# Patient Record
Sex: FEMALE | Race: BLACK OR AFRICAN AMERICAN | NOT HISPANIC OR LATINO | Employment: FULL TIME
[De-identification: names, ages, dates, MRNs, and addresses within clinical notes are randomized per-mention and may not be internally consistent; named-entity substitution may affect disease eponyms.]

---

## 2017-09-17 ENCOUNTER — HEALTH MAINTENANCE LETTER (OUTPATIENT)
Age: 42
End: 2017-09-17

## 2019-05-28 ENCOUNTER — AMBULATORY - HEALTHEAST (OUTPATIENT)
Dept: MULTI SPECIALTY CLINIC | Facility: CLINIC | Age: 44
End: 2019-05-28

## 2019-05-28 LAB
HPV_EXT - HISTORICAL: NORMAL
PAP SMEAR - HIM PATIENT REPORTED: NORMAL

## 2019-10-17 ENCOUNTER — COMMUNICATION - HEALTHEAST (OUTPATIENT)
Dept: TELEHEALTH | Facility: CLINIC | Age: 44
End: 2019-10-17

## 2019-10-17 ENCOUNTER — OFFICE VISIT - HEALTHEAST (OUTPATIENT)
Dept: FAMILY MEDICINE | Facility: CLINIC | Age: 44
End: 2019-10-17

## 2019-10-17 DIAGNOSIS — Z00.00 ENCOUNTER FOR GENERAL ADULT MEDICAL EXAMINATION WITHOUT ABNORMAL FINDINGS: ICD-10-CM

## 2019-10-17 DIAGNOSIS — Z76.89 ENCOUNTER TO ESTABLISH CARE: ICD-10-CM

## 2019-10-17 DIAGNOSIS — Z12.31 VISIT FOR SCREENING MAMMOGRAM: ICD-10-CM

## 2019-10-17 DIAGNOSIS — N91.2 AMENORRHEA: ICD-10-CM

## 2019-10-17 ASSESSMENT — MIFFLIN-ST. JEOR: SCORE: 1449.63

## 2019-11-08 ENCOUNTER — HEALTH MAINTENANCE LETTER (OUTPATIENT)
Age: 44
End: 2019-11-08

## 2020-02-23 ENCOUNTER — HEALTH MAINTENANCE LETTER (OUTPATIENT)
Age: 45
End: 2020-02-23

## 2020-12-06 ENCOUNTER — HEALTH MAINTENANCE LETTER (OUTPATIENT)
Age: 45
End: 2020-12-06

## 2021-02-20 ENCOUNTER — HEALTH MAINTENANCE LETTER (OUTPATIENT)
Age: 46
End: 2021-02-20

## 2021-06-02 NOTE — PROGRESS NOTES
Advice to securely store firearms given in AVS.  The patient was counseled and encouraged to consider modifying their diet and eating habits. She was provided with information on recommended healthy diet options.

## 2021-06-03 VITALS
HEART RATE: 105 BPM | HEIGHT: 65 IN | BODY MASS INDEX: 29.89 KG/M2 | DIASTOLIC BLOOD PRESSURE: 65 MMHG | SYSTOLIC BLOOD PRESSURE: 103 MMHG | OXYGEN SATURATION: 96 % | WEIGHT: 179.4 LBS

## 2021-06-17 NOTE — PATIENT INSTRUCTIONS - HE
Patient Instructions by Elly Gilmore FNP at 10/17/2019 11:10 AM     Author: Elly Gilmore FNP Service: -- Author Type: Nurse Practitioner    Filed: 10/17/2019 11:49 AM Encounter Date: 10/17/2019 Status: Signed    : Elly Gilmore FNP (Nurse Practitioner)           Preventing Skin Cancer     Use sunscreen of SPF 30 or greater. Apply liberally.   Relaxing in the sun may feel good. But it isnt good for your skin. In fact, the suns harmful rays are the major cause of skin cancer. This is a serious disease that can be life-threatening. People of all ages, races, and backgrounds are at risk.  Skin cancer is the most common cancer in the [place] But in most cases, it can be prevented.  Your role in prevention  You can act today to help prevent skin cancer. Start by avoiding the suns UV (ultraviolet) rays. And dont use tanning beds or lamps. They are no safer than the sun. Taking these steps can help keep you from getting skin cancer. It can also help prevent wrinkles and other aging effects caused by the sun. Make sure your children also follow these safeguards. Now is the time to start taking steps to prevent skin cancer.  When you are outdoors  Protect your skin when you go out during the day. Take safety steps whenever you go out to eat, run errands by car or on foot, or do any outdoor activity. There isnt just one easy way to protect your skin. Its best to follow all of these steps:    Wear tightly woven clothing that covers your skin. Put on a wide-brimmed hat to protect your face, ears, and scalp.    Watch the clock. Try to stay out of the sun between 10 a.m. and 4 p.m. That's when the sun's rays are strongest.    Head for the shade or create your own. Use an umbrella when sitting or strolling.    Know that the suns rays can reflect off sand, water, and snow. This can harm your skin. Take extra care when you are near reflective surfaces.    Keep in mind that even when the weather is hazy or  "cloudy, your skin can be exposed to strong UV rays.    Shield your skin with sunscreen. Also use sunscreen on your childrens skin. Keep babies younger than 6 months old out of the sun.  Tips for using sunscreen  To help prevent skin cancer, choose the right sunscreen and use it correctly. Try these tips:    Choose a sunscreen that has an SPF (sun protection factor) of at least 30. Also choose a sunscreen labeled \"broad spectrum. This will protect you from both UVA and UVB (ultraviolet A and B) rays.    If one brand irritates your skin, try another, such as one without fragrance.    Use a water-resistant sunscreen if you swim or sweat.    Use at least 1 ounce of sunscreen to cover exposed areas. This is enough to fill a shot glass. You might need to adjust the amount depending on your body size.    Put the sunscreen on dry skin about 15 minutes before going outdoors. This gives it time to soak in.    Reapply sunscreen every 2 hours. If youre active, do this more often.    Cover any sun-exposed skin, from your face to your feet. Dont forget your scalp, ears, and lips.    Know that while sunscreen helps protect you, it isnt enough. Sunscreens extend the length of time you can be outdoors before your skin starts to get red. But they don't give you total protection. Using sunscreen doesn't mean you can stay out in the sun for an unlimited time. Your skin cells are still being damaged. You should also wear protective clothing. And try to stay out of the sun as much as you can, especially from 10 a.m. to 4 p.m.  Date Last Reviewed: 7/1/2019 2000-2019 [employer]. [address] [place] [address], [place] [address]. All rights reserved. This information is not intended as a substitute for professional medical care. Always follow your healthcare professional's instructions.          Firearm Safety  From 2005 to 2014, roughly 20,000 American minors were killed or seriously injured in accidental shootings; the majority " of those killed in these tragic accidents were aged 12 or younger. As such the American Academy of Pediatrics, American Academy of Family Physicians, American College of Physicians and the National Rifle Association all recommend preventing unintentional use of firearms by securely storing firearms in locked safe or at minimum a trigger lock.  Patient Education   Understanding USDA MyPlate  The USDA (US Department of Agriculture) has guidelines to help you make healthy food choices. These are called MyPlate. MyPlate shows the food groups that make up healthy meals using the image of a place setting. Before you eat, think about the healthiest choices for what to put onto your plate or into your cup or bowl. To learn more about building a healthy plate, visit www.choosemyplate.gov.       The Food Groups    Fruits: Any fruit or 100% fruit juice counts as part of the Fruit Group. Fruits may be fresh, canned, frozen, or dried, and may be whole, cut-up, or pureed. Make half your plate fruits and vegetables.    Vegetables: Any vegetable or 100% vegetable juice counts as a member of the Vegetable Group. Vegetables may be fresh, frozen, canned, or dried. They can be served raw or cooked and may be whole, cut-up, or mashed. Make half your plate fruits and vegetables.     Grains: All foods made from grains are part of the Grains Group. These include wheat, rice, oats, cornmeal, and barley such as bread, pasta, oatmeal, cereal, tortillas, and grits. Grains should be no more than a quarter of your plate. At least half of your grains should be whole grains.    Protein: This group includes meat, poultry, seafood, beans and peas, eggs, processed soy products (like tofu), nuts (including nut butters), and seeds. Make protein choices no more than a quarter of your plate. Meat and poultry choices should be lean or low fat.    Dairy: All fluid milk products and foods made from milk that contain calcium, like yogurt and cheese are part  of the Dairy Group. (Foods that have little calcium, such as cream, butter, and cream cheese, are not part of the group.) Most dairy choices should be low-fat or fat-free.    Oils: These are fats that are liquid at room temperature. They include canola, corn, olive, soybean, and sunflower oil. Foods that are mainly oil include mayonnaise, certain salad dressings, and soft margarines. You should have only 5 to 7 teaspoons of oils a day. You probably already get this much from the food you eat.  Use [employer] to Help Build Your Meals  The [employer] can help you plan and track your meals and activity. You can look up individual foods to see or compare their nutritional value. You can get guidelines for what and how much you should eat. You can compare your food choices. And you can assess personal physical activities and see ways you can improve. Go to www.[employer].gov/Seriouslycker/.    3978-6109 [employer]. [address] [place] [address], [place] [address]. All rights reserved. This information is not intended as a substitute for professional medical care. Always follow your healthcare professional's instructions.

## 2021-06-22 ENCOUNTER — COMMUNICATION - HEALTHEAST (OUTPATIENT)
Dept: SCHEDULING | Facility: CLINIC | Age: 46
End: 2021-06-22

## 2021-06-23 ENCOUNTER — COMMUNICATION - HEALTHEAST (OUTPATIENT)
Dept: SURGERY | Facility: CLINIC | Age: 46
End: 2021-06-23

## 2021-06-28 ENCOUNTER — COMMUNICATION - HEALTHEAST (OUTPATIENT)
Dept: SURGERY | Facility: CLINIC | Age: 46
End: 2021-06-28

## 2021-06-28 NOTE — PROGRESS NOTES
Progress Notes by Elly Gilmore FNP at 10/17/2019 11:10 AM     Author: Elly Gilmore FNP Service: -- Author Type: Nurse Practitioner    Filed: 10/17/2019 12:37 PM Encounter Date: 10/17/2019 Status: Signed    : Elly Gilmore FNP (Nurse Practitioner)       FEMALE PREVENTATIVE EXAM    Assessment and Plan:   1. Encounter to establish care  2. Encounter for general adult medical examination without abnormal findings  Healthy female patient   - HIV Antigen/Antibody Screening [place]; Future  - Comprehensive Metabolic Panel; Future  - Lipid Cascade; Future  - HM2(CBC w/o Differential); Future    3. Visit for screening mammogram  Order placed after discussing it relevance  - Mammo Screening Bilateral; Future    4. 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 Recommend that patient continue to monitor for another 8 months to confirm diagnosis.      Next follow up:  No follow-ups on file.    Immunization Review  Adult Imm Review: Declines immunizations today    I discussed the following with the patient:   Adult Healthy Living: Importance of regular exercise  Healthy nutrition  Getting adequate sleep  Use of seat belts  Distracted driving  Helmets  Sporting equipment safety  Firearm safety  STI prevention  Supplement use  Herbal medications/alternative medical therapies    I have had an Advance Directives discussion with the patient.    Subjective:   Chief Complaint: Oliva Berry is an 44 y.o. female here for a preventative health visit.     HPI:  Patient here to establish care and for preventive care. She reports that she is taking medication for headache which was prescribed by her neurologist. 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 Patient reports that she has not seen her period for 3 months. She reports this has happened before. She also reports that she think she has bacteria infection even though she does not have any symptoms. Patient denied chest pain, shortness of  "breath, fever and chills.     Healthy Habits  Are you taking a daily aspirin? No  Do you typically exercising at least 40 min, 3-4 times per week?  Yes  Do you usually eat at least 4 servings of fruit and vegetables a day, include whole grains and fiber and avoid regularly eating high fat foods? NO  Have you had an eye exam in the past two years? NO  Do you see a dentist twice per year? NO  Do you have any concerns regarding sleep? No    Safety Screen  If you own firearms, are they secured in a locked gun cabinet or with trigger locks? NO  No data recorded    Review of Systems:  Please see above.  The rest of the review of systems are negative for all systems.     Pap History:   Yes - updated in Problem List and Health Maintenance accordingly  Cancer Screening       Status Date      MAMMOGRAM Overdue 1974     PAP SMEAR Overdue 5/16/1999           Patient Care Team:  Elly Gilmore FNP as PCP - General (Nurse Practitioner)        History     Not marked as reviewed during this visit.            Objective:   Vital Signs: There were no vitals taken for this visit.       PHYSICAL EXAM  /65   Pulse (!) 105   Ht 5' 5\" (1.651 m)   Wt 179 lb 6.4 oz (81.4 kg)   LMP 07/15/2019 (Approximate)   SpO2 96%   BMI 29.85 kg/m    General appearance: alert, appears stated age and cooperative  Head: Normocephalic, without obvious abnormality, atraumatic  Eyes: conjunctivae/corneas clear. PERRL, EOM's intact. Fundi benign.  Ears: normal TM's and external ear canals both ears  Throat: lips, mucosa, and tongue normal; teeth and gums normal  Neck: no adenopathy, no carotid bruit, no JVD, supple, symmetrical, trachea midline and thyroid not enlarged, symmetric, no tenderness/mass/nodules  Back: symmetric, no curvature. ROM normal. No CVA tenderness.  Lungs: clear to auscultation bilaterally  Breasts: deferred  Heart: regular rate and rhythm, S1, S2 normal, no murmur, click, rub or gallop  Abdomen: soft, non-tender; bowel " sounds normal; no masses,  no organomegaly  Pelvic: deferred, patient had her little child with her.  Extremities: extremities normal, atraumatic, no cyanosis or edema  Pulses: 2+ and symmetric  Skin: Skin color, texture, turgor normal. No rashes or lesions  Lymph nodes: Cervical, supraclavicular, and axillary nodes normal.  Neurologic: Grossly normal      The ASCVD Risk score (Raymond TESS Luo., et al., 2013) failed to calculate for the following reasons:    Cannot find a previous HDL lab    Cannot find a previous total cholesterol lab         Medication List          Accurate as of October 17, 2019 12:33 PM. If you have any questions, ask your nurse or doctor.            CONTINUE taking these medications    topiramate 50 MG tablet  Also known as:  TOPAMAX  INSTRUCTIONS:  Take 50 mg by mouth.               Additional Screenings Completed Today:

## 2021-07-05 PROBLEM — E66.811 CLASS 1 OBESITY DUE TO EXCESS CALORIES WITHOUT SERIOUS COMORBIDITY WITH BODY MASS INDEX (BMI) OF 32.0 TO 32.9 IN ADULT: Status: ACTIVE | Noted: 2019-04-17

## 2021-07-05 PROBLEM — E66.09 CLASS 1 OBESITY DUE TO EXCESS CALORIES WITHOUT SERIOUS COMORBIDITY WITH BODY MASS INDEX (BMI) OF 32.0 TO 32.9 IN ADULT: Status: ACTIVE | Noted: 2019-04-17

## 2021-07-05 PROBLEM — T81.40XA POSTOPERATIVE INFECTION, INITIAL ENCOUNTER: Status: ACTIVE | Noted: 2021-06-21

## 2021-07-05 PROBLEM — G43.909 MIGRAINE: Status: ACTIVE | Noted: 2021-06-20

## 2021-07-05 PROBLEM — T81.40XA POSTOPERATIVE INFECTION, UNSPECIFIED TYPE, INITIAL ENCOUNTER: Status: ACTIVE | Noted: 2021-06-20

## 2021-07-06 ENCOUNTER — OFFICE VISIT - HEALTHEAST (OUTPATIENT)
Dept: SURGERY | Facility: CLINIC | Age: 46
End: 2021-07-06

## 2021-07-07 NOTE — LETTER
Letter by Scarlet Bhatt LPN at      Author: Scarlet Bhatt LPN Service: -- Author Type: --    Filed:  Encounter Date: 6/28/2021 Status: (Other)         June 28, 2021     Patient: Oliva Berry   YOB: 1975   Date of Visit: 6/28/2021       To Whom It May Concern:    It is my medical opinion that Oliva Berry may return to full duty immediately with no restrictions on 6/29/2021.    If you have any questions or concerns, please don't hesitate to call.    Sincerely,        Electronically signed by Prasanna PÉREZ

## 2021-07-13 ENCOUNTER — OFFICE VISIT (OUTPATIENT)
Dept: SURGERY | Facility: CLINIC | Age: 46
End: 2021-07-13
Payer: COMMERCIAL

## 2021-07-13 DIAGNOSIS — S31.109D OPEN WOUND OF ANTERIOR ABDOMINAL WALL, SUBSEQUENT ENCOUNTER: Primary | ICD-10-CM

## 2021-07-13 PROCEDURE — 99213 OFFICE O/P EST LOW 20 MIN: CPT | Performed by: PHYSICIAN ASSISTANT

## 2021-07-13 NOTE — LETTER
7/13/2021         RE: Oliva Berry  [address]  [place] [address]        Dear Colleague,    Thank you for referring your patient, Oliva Berry, to the [facility] [place]. Please see a copy of my visit note below.    HPI: Pt is here for follow up of a follow-up of wound that we saw patient in the hospital for wound care and subsequent postop care in the office.: 46-year-old female with history of panniculectomy that was performed in [place] on 6/15/2021 . She is doing well. She has been packing the wound. No issues.      There were no vitals taken for this visit.    EXAM:  GENERAL:Appears well  ABDOMEN:  Soft, +BS  Wounds are healing nicely and pretty much dry on the lateral edges. On the mid open wound she has a 0.4 x 0.4 cm open wound that is showing some granulation tissue. This is closing pretty close    Assessment/Plan: . Doing well after surgery and should follow up as needed. No further packing really needed at this point this wound is healing nicely. Cover with gauze until this heals completely.   Prasanna Greene PA-C PA-C            Again, thank you for allowing me to participate in the care of your patient.        Sincerely,        Prasanna Greene PA-C

## 2021-07-20 NOTE — PROGRESS NOTES
HPI: Pt is here for follow up of a follow-up of wound that we saw patient in the hospital for wound care and subsequent postop care in the office.: 46-year-old female with history of panniculectomy that was performed in [place] on 6/15/2021 . She is doing well. She has been packing the wound. No issues.      There were no vitals taken for this visit.    EXAM:  GENERAL:Appears well  ABDOMEN:  Soft, +BS  Wounds are healing nicely and pretty much dry on the lateral edges. On the mid open wound she has a 0.4 x 0.4 cm open wound that is showing some granulation tissue. This is closing pretty close    Assessment/Plan: . Doing well after surgery and should follow up as needed. No further packing really needed at this point this wound is healing nicely. Cover with gauze until this heals completely.   Prasanna Greene PA-C PA-C

## 2021-07-20 NOTE — PROGRESS NOTES
Progress Notes by Prasanna Greene PA-C at 7/6/2021  3:45 PM     Author: Prasanna Greene PA-C Service: -- Author Type: Physician Assistant    Filed: 7/20/2021  1:48 PM Encounter Date: 7/6/2021 Status: Signed    : Prasanna Greene PA-C (Physician Assistant)       HPI: Pt is here for follow up of a abdominal wound infection. 46-year-old female with history of panniculectomy that was performed in [place] on 6/15/2021. Seen by our group during hospital stay 6/20-6/22. Packing own wound. No fevers. No pain      There were no vitals taken for this visit.    EXAM:  GENERAL:Appears well  ABDOMEN:  Soft, +BS  SURGICAL WOUNDS:  Healing. Open wound midline with purulent drainage as not granulating and not being packed all the way.   Assessment/Plan: . Packing with silver cell for three days then back to wet to moist. See me in one to two weeks.   Prasanna Greene PA-C  [facility] Department of Surgery

## 2021-07-26 ENCOUNTER — OFFICE VISIT (OUTPATIENT)
Dept: SURGERY | Facility: CLINIC | Age: 46
End: 2021-07-26
Payer: COMMERCIAL

## 2021-07-26 DIAGNOSIS — S31.109D OPEN WOUND OF ANTERIOR ABDOMINAL WALL, SUBSEQUENT ENCOUNTER: ICD-10-CM

## 2021-07-26 DIAGNOSIS — R10.33 ABDOMINAL WALL PAIN IN PERIUMBILICAL REGION: Primary | ICD-10-CM

## 2021-07-26 PROCEDURE — 99213 OFFICE O/P EST LOW 20 MIN: CPT | Performed by: PHYSICIAN ASSISTANT

## 2021-07-26 NOTE — LETTER
7/26/2021         RE: Oliva Berry  [address]  [place] [address]        Dear Colleague,    Thank you for referring your patient, Oliva Berry, to the [facility] [place]. Please see a copy of my visit note below.    HPI:Pt is here for follow up of a abdominal wound infection. 46-year-old female with history of panniculectomy that was performed in [place] on 6/15/2021. Seen by our group during hospital stay 6/20-6/22.  At last visit last week patient was doing much better.  She decided to come in today as she was concerned about increase in drainage from her umbilicus.  Drainage is foul.  She also noticed a break down of her skin on her lower abdomen was concerned about a blister.  She also feels excessive tenderness in her lower abdomen.  Denies any fever or chills.      There were no vitals taken for this visit.    EXAM:  GENERAL:Appears well  ABDOMEN:  Soft, +BS  SURGICAL WOUNDS:  Incisions healing well, no enduration or drainage.  Wounds actually look really well.  Her bellybutton is quite deep which I think is what is causing some of the drainage.  She does have some tenderness abdominal wall but I cannot palpate any fluctuance or redness.  Patient quite concerned.  We will get an ultrasound        Assessment/Plan: . Doing well after surgery and should follow up as needed.  Discussed keeping her wounds clean and dry and she manus need to use a Q-tip to keep dry due to patient's concern and pain I ordered an ultrasound to look for fluid collection..  Recommend follow-up with plastics.  No further follow-up with us needed.  Prasanna Greene PA-C PA-C  [facility] Department of Surgery          Again, thank you for allowing me to participate in the care of your patient.        Sincerely,        Prasanna Greene PA-C

## 2021-07-27 NOTE — PROGRESS NOTES
HPI:Pt is here for follow up of a abdominal wound infection. 46-year-old female with history of panniculectomy that was performed in [place] on 6/15/2021. Seen by our group during hospital stay 6/20-6/22.  At last visit last week patient was doing much better.  She decided to come in today as she was concerned about increase in drainage from her umbilicus.  Drainage is foul.  She also noticed a break down of her skin on her lower abdomen was concerned about a blister.  She also feels excessive tenderness in her lower abdomen.  Denies any fever or chills.      There were no vitals taken for this visit.    EXAM:  GENERAL:Appears well  ABDOMEN:  Soft, +BS  SURGICAL WOUNDS:  Incisions healing well, no enduration or drainage.  Wounds actually look really well.  Her bellybutton is quite deep which I think is what is causing some of the drainage.  She does have some tenderness abdominal wall but I cannot palpate any fluctuance or redness.  Patient quite concerned.  We will get an ultrasound        Assessment/Plan: . Doing well after surgery and should follow up as needed.  Discussed keeping her wounds clean and dry and she manus need to use a Q-tip to keep dry due to patient's concern and pain I ordered an ultrasound to look for fluid collection..  Recommend follow-up with plastics.  No further follow-up with us needed.  Prasanna Greene PA-C PA-C  [facility] Department of Surgery

## 2021-09-25 ENCOUNTER — HEALTH MAINTENANCE LETTER (OUTPATIENT)
Age: 46
End: 2021-09-25

## 2022-01-15 ENCOUNTER — HEALTH MAINTENANCE LETTER (OUTPATIENT)
Age: 47
End: 2022-01-15

## 2022-03-12 ENCOUNTER — HEALTH MAINTENANCE LETTER (OUTPATIENT)
Age: 47
End: 2022-03-12

## 2023-04-22 ENCOUNTER — HEALTH MAINTENANCE LETTER (OUTPATIENT)
Age: 48
End: 2023-04-22

## 2023-12-02 ENCOUNTER — HEALTH MAINTENANCE LETTER (OUTPATIENT)
Age: 48
End: 2023-12-02

## 2024-01-07 ENCOUNTER — HOSPITAL ENCOUNTER (EMERGENCY)
Facility: CLINIC | Age: 49
Discharge: HOME | End: 2024-01-07
Attending: EMERGENCY MEDICINE | Admitting: EMERGENCY MEDICINE
Payer: COMMERCIAL

## 2024-01-07 ENCOUNTER — APPOINTMENT (OUTPATIENT)
Dept: CT IMAGING | Facility: CLINIC | Age: 49
End: 2024-01-07
Attending: EMERGENCY MEDICINE
Payer: COMMERCIAL

## 2024-01-07 VITALS
HEIGHT: 64 IN | BODY MASS INDEX: 31.76 KG/M2 | TEMPERATURE: 98.3 F | DIASTOLIC BLOOD PRESSURE: 74 MMHG | RESPIRATION RATE: 20 BRPM | SYSTOLIC BLOOD PRESSURE: 128 MMHG | WEIGHT: 186 LBS | HEART RATE: 109 BPM | OXYGEN SATURATION: 98 %

## 2024-01-07 DIAGNOSIS — M54.41 ACUTE RIGHT-SIDED LOW BACK PAIN WITH RIGHT-SIDED SCIATICA: ICD-10-CM

## 2024-01-07 LAB
ALBUMIN SERPL BCG-MCNC: 4.4 G/DL (ref 3.5–5.2)
ALBUMIN UR-MCNC: NEGATIVE MG/DL
ALP SERPL-CCNC: 108 U/L (ref 40–150)
ALT SERPL W P-5'-P-CCNC: 7 U/L (ref 0–50)
ANION GAP SERPL CALCULATED.3IONS-SCNC: 11 MMOL/L (ref 7–15)
APPEARANCE UR: CLEAR
AST SERPL W P-5'-P-CCNC: 26 U/L (ref 0–45)
BASOPHILS # BLD AUTO: 0 10E3/UL (ref 0–0.2)
BASOPHILS NFR BLD AUTO: 1 %
BILIRUB SERPL-MCNC: 0.6 MG/DL
BILIRUB UR QL STRIP: NEGATIVE
BUN SERPL-MCNC: 13 MG/DL (ref 6–20)
CALCIUM SERPL-MCNC: 9.3 MG/DL (ref 8.6–10)
CHLORIDE SERPL-SCNC: 109 MMOL/L (ref 98–107)
COLOR UR AUTO: ABNORMAL
CREAT SERPL-MCNC: 0.72 MG/DL (ref 0.51–0.95)
DEPRECATED HCO3 PLAS-SCNC: 18 MMOL/L (ref 22–29)
EGFRCR SERPLBLD CKD-EPI 2021: >90 ML/MIN/1.73M2
EOSINOPHIL # BLD AUTO: 0.1 10E3/UL (ref 0–0.7)
EOSINOPHIL NFR BLD AUTO: 2 %
ERYTHROCYTE [DISTWIDTH] IN BLOOD BY AUTOMATED COUNT: 13.2 % (ref 10–15)
GLUCOSE SERPL-MCNC: 92 MG/DL (ref 70–99)
GLUCOSE UR STRIP-MCNC: NEGATIVE MG/DL
HCT VFR BLD AUTO: 44.4 % (ref 35–47)
HGB BLD-MCNC: 14.8 G/DL (ref 11.7–15.7)
HGB UR QL STRIP: NEGATIVE
HOLD SPECIMEN: NORMAL
IMM GRANULOCYTES # BLD: 0 10E3/UL
IMM GRANULOCYTES NFR BLD: 0 %
KETONES UR STRIP-MCNC: NEGATIVE MG/DL
LEUKOCYTE ESTERASE UR QL STRIP: NEGATIVE
LIPASE SERPL-CCNC: 40 U/L (ref 13–60)
LYMPHOCYTES # BLD AUTO: 2.5 10E3/UL (ref 0.8–5.3)
LYMPHOCYTES NFR BLD AUTO: 37 %
MCH RBC QN AUTO: 29.4 PG (ref 26.5–33)
MCHC RBC AUTO-ENTMCNC: 33.3 G/DL (ref 31.5–36.5)
MCV RBC AUTO: 88 FL (ref 78–100)
MONOCYTES # BLD AUTO: 0.5 10E3/UL (ref 0–1.3)
MONOCYTES NFR BLD AUTO: 7 %
MUCOUS THREADS #/AREA URNS LPF: PRESENT /LPF
NEUTROPHILS # BLD AUTO: 3.7 10E3/UL (ref 1.6–8.3)
NEUTROPHILS NFR BLD AUTO: 53 %
NITRATE UR QL: NEGATIVE
NRBC # BLD AUTO: 0 10E3/UL
NRBC BLD AUTO-RTO: 0 /100
PH UR STRIP: 6 [PH] (ref 5–7)
PLATELET # BLD AUTO: 222 10E3/UL (ref 150–450)
POTASSIUM SERPL-SCNC: 5.1 MMOL/L (ref 3.4–5.3)
PROT SERPL-MCNC: 7 G/DL (ref 6.4–8.3)
RBC # BLD AUTO: 5.03 10E6/UL (ref 3.8–5.2)
RBC URINE: 1 /HPF
SODIUM SERPL-SCNC: 138 MMOL/L (ref 135–145)
SP GR UR STRIP: 1.02 (ref 1–1.03)
SQUAMOUS EPITHELIAL: 1 /HPF
UROBILINOGEN UR STRIP-MCNC: <2 MG/DL
WBC # BLD AUTO: 6.8 10E3/UL (ref 4–11)
WBC URINE: 1 /HPF

## 2024-01-07 PROCEDURE — 81015 MICROSCOPIC EXAM OF URINE: CPT | Performed by: EMERGENCY MEDICINE

## 2024-01-07 PROCEDURE — 85025 COMPLETE CBC W/AUTO DIFF WBC: CPT | Performed by: EMERGENCY MEDICINE

## 2024-01-07 PROCEDURE — 36415 COLL VENOUS BLD VENIPUNCTURE: CPT | Performed by: EMERGENCY MEDICINE

## 2024-01-07 PROCEDURE — 250N000011 HC RX IP 250 OP 636: Performed by: EMERGENCY MEDICINE

## 2024-01-07 PROCEDURE — 83690 ASSAY OF LIPASE: CPT | Performed by: EMERGENCY MEDICINE

## 2024-01-07 PROCEDURE — 96374 THER/PROPH/DIAG INJ IV PUSH: CPT | Mod: 59

## 2024-01-07 PROCEDURE — 96375 TX/PRO/DX INJ NEW DRUG ADDON: CPT

## 2024-01-07 PROCEDURE — 74177 CT ABD & PELVIS W/CONTRAST: CPT

## 2024-01-07 PROCEDURE — 99285 EMERGENCY DEPT VISIT HI MDM: CPT | Mod: 25

## 2024-01-07 PROCEDURE — 80053 COMPREHEN METABOLIC PANEL: CPT | Performed by: EMERGENCY MEDICINE

## 2024-01-07 RX ORDER — ONDANSETRON 2 MG/ML
8 INJECTION INTRAMUSCULAR; INTRAVENOUS ONCE
Status: COMPLETED | OUTPATIENT
Start: 2024-01-07 | End: 2024-01-07

## 2024-01-07 RX ORDER — OXYCODONE HYDROCHLORIDE 5 MG/1
5 TABLET ORAL EVERY 6 HOURS PRN
Qty: 12 TABLET | Refills: 0 | Status: SHIPPED | OUTPATIENT
Start: 2024-01-07 | End: 2024-01-10

## 2024-01-07 RX ORDER — ORPHENADRINE CITRATE 30 MG/ML
60 INJECTION INTRAMUSCULAR; INTRAVENOUS ONCE
Status: COMPLETED | OUTPATIENT
Start: 2024-01-07 | End: 2024-01-07

## 2024-01-07 RX ORDER — MORPHINE SULFATE 4 MG/ML
4 INJECTION, SOLUTION INTRAMUSCULAR; INTRAVENOUS ONCE
Status: COMPLETED | OUTPATIENT
Start: 2024-01-07 | End: 2024-01-07

## 2024-01-07 RX ORDER — PREDNISONE 20 MG/1
TABLET ORAL
Qty: 10 TABLET | Refills: 0 | Status: SHIPPED | OUTPATIENT
Start: 2024-01-07 | End: 2024-01-19

## 2024-01-07 RX ORDER — IOPAMIDOL 755 MG/ML
90 INJECTION, SOLUTION INTRAVASCULAR ONCE
Status: COMPLETED | OUTPATIENT
Start: 2024-01-07 | End: 2024-01-07

## 2024-01-07 RX ORDER — CYCLOBENZAPRINE HCL 10 MG
10 TABLET ORAL 3 TIMES DAILY PRN
Qty: 20 TABLET | Refills: 0 | Status: SHIPPED | OUTPATIENT
Start: 2024-01-07 | End: 2024-01-13

## 2024-01-07 RX ADMIN — MORPHINE SULFATE 4 MG: 4 INJECTION, SOLUTION INTRAMUSCULAR; INTRAVENOUS at 08:58

## 2024-01-07 RX ADMIN — ONDANSETRON 8 MG: 2 INJECTION INTRAMUSCULAR; INTRAVENOUS at 08:58

## 2024-01-07 RX ADMIN — IOPAMIDOL 90 ML: 755 INJECTION, SOLUTION INTRAVENOUS at 09:22

## 2024-01-07 ASSESSMENT — ACTIVITIES OF DAILY LIVING (ADL): ADLS_ACUITY_SCORE: 35

## 2024-01-07 NOTE — ED TRIAGE NOTES
Patient reports nausea, vomiting, diarrhea beginning last week. Since Friday patient has RLQ abdominal pain worsening over time. Pain is radiating into right thigh and right low back. 10/10.Difficult to ambulate due to pain.

## 2024-01-07 NOTE — ED PROVIDER NOTES
EMERGENCY DEPARTMENT ENCOUNTER     NAME: Oliva Berry   AGE: 48 year old female   YOB: 1975   MRN: [id number]   EVALUATION DATE & TIME: 1/7/2024  7:44 AM   PCP: No primary care provider on file.     Chief Complaint   Patient presents with    Abdominal Pain    Nausea, Vomiting, & Diarrhea   :    FINAL IMPRESSION       1. Acute right-sided low back pain with right-sided sciatica           ED COURSE & MEDICAL DECISION MAKING    7:53 AM I introduced myself to the patient, obtained patient history, performed a physical exam, and discussed plan for ED workup including potential diagnostic laboratory/imaging studies and interventions.  10:34 AM Rechecked and updated the patient. We discussed the plan for discharge and the patient is agreeable. Reviewed supportive cares, symptomatic treatment, outpatient follow up, and reasons to return to the Emergency Department. Patient to be discharged by ED RN.     Pertinent Labs & Imaging studies reviewed. (See chart for details)   48 year old female  presents to the Emergency Department for evaluation of right lower back and buttocks discomfort radiating around to her right lower quadrant and anterior right leg. Initial Vitals Reviewed. Initial exam notable for patient is having difficulty ambulating secondary to pain but has intact strength and sensation.  On exam, she is actually tender near the SI joint but also around in the right lower quadrant.  I suspect that this is a radicular syndrome caused by a lower back etiology, but we did discuss that with the tenderness we are also can do a workup to rule out something like nephrolithiasis or appendicitis.  Fortunately, her lab work and CT imaging are reassuring and negative.  After she was treated with pain control, antiemetics, muscle relaxers here she is improved and is able to get up and ambulate.  I do think that with a negative abdominal workup the etiology is coming from her lower back.  She is going to stay  on RICE instructions at home and I am prescribing Flexeril, oxycodone, and a course of prednisone for discomfort at home.  We discussed spine clinic follow-up and I have placed a referral.  She was given return precautions and discharged in stable improved condition.           At the conclusion of the encounter I discussed the results of all of the tests and the disposition. The questions were answered. The patient or family acknowledged understanding and was agreeable with the care plan.     0 minutes critical care time, see procedure note below for details if relevant    Medical Decision Making  Obtained supplemental history:Supplemental history obtained?: No  Reviewed external records: External records reviewed?: No  Care impacted by chronic illness:N/A  Care significantly affected by social determinants of health:access tocare  Did you consider but not order tests?: Work up considered but not performed and documented in chart, if applicable  Did you interpret images independently?: My independent interpretation of imaging: CT without obvious appendicitis or stone  Consultation discussion with other provider:Did you involve another provider (consultant, MH, pharmacy, etc.)?: No  Discharge. I prescribed additional prescription strength medication(s) as charted. I considered admission, but ultimately discharged patient with reassuring workup and clinical improvement.        MEDICATIONS GIVEN IN THE EMERGENCY:   Medications   morphine (PF) injection 4 mg (4 mg Intravenous $Given 1/7/24 0858)   ondansetron (ZOFRAN) injection 8 mg (8 mg Intravenous $Given 1/7/24 0858)   iopamidol (ISOVUE-370) solution 90 mL (90 mLs Intravenous $Given 1/7/24 0922)   orphenadrine (NORFLEX) injection 60 mg (60 mg Intravenous Not Given 1/7/24 1021)      NEW PRESCRIPTIONS STARTED AT TODAY'S ER VISIT   Discharge Medication List as of 1/7/2024 10:29 AM        START taking these medications    Details   cyclobenzaprine (FLEXERIL) 10 MG tablet  Take 1 tablet (10 mg) by mouth 3 times daily as needed for muscle spasms, Disp-20 tablet, R-0, Local Print      oxyCODONE (ROXICODONE) 5 MG tablet Take 1 tablet (5 mg) by mouth every 6 hours as needed for pain, Disp-12 tablet, R-0, Local Print      predniSONE (DELTASONE) 20 MG tablet Take two tablets (= 40mg) each day for 5 (five) days, Disp-10 tablet, R-0, Local Print           ================================================================   HISTORY OF PRESENT ILLNESS       Patient information was obtained from: Patient   Use of Intrepreter: N/A  Hope MEGAN Berry is a 48 year old female with history of abdominoplasty with postoperative infection and tubal ligation who presents for evaluation of back pain.    The patient reports she has had 3 days of worsening right lower back pain that wraps around to her right lower abdomen. She rates the pain 10/10. Se reports difficulty ambulating de to the pain. She has had nausea, vomiting, and diarrhea for the past week. Her nausea has worsened since the pain started.    ================================================================        PAST HISTORY     PAST MEDICAL HISTORY:   No past medical history on file.   PAST SURGICAL HISTORY:   Past Surgical History:   Procedure Laterality Date    TUBAL LIGATION  2015      CURRENT MEDICATIONS:   cyclobenzaprine (FLEXERIL) 10 MG tablet  oxyCODONE (ROXICODONE) 5 MG tablet  predniSONE (DELTASONE) 20 MG tablet  acetaminophen (TYLENOL) 325 MG tablet  ibuprofen (ADVIL,MOTRIN) 600 MG tablet  topiramate (TOPAMAX) 50 MG tablet      ALLERGIES:   Allergies   Allergen Reactions    Sulfa (Sulfonamide Antibiotics) [Sulfa Antibiotics] Shortness Of Breath    Metronidazole Nausea and Vomiting     rashes    Penicillins Rash      FAMILY HISTORY:   Family History   Problem Relation Age of Onset    Cancer Mother     Lung Cancer Mother     Colon Cancer Mother     Diabetes Mother     Cancer Father     Lung Cancer Father       SOCIAL HISTORY:   Social  "History     Socioeconomic History    Marital status: Single   Tobacco Use    Smoking status: Every Day     Packs/day: 0.25     Years: 20.00     Additional pack years: 0.00     Total pack years: 5.00     Types: Cigarettes    Smokeless tobacco: Never   Substance and Sexual Activity    Alcohol use: Not Currently    Drug use: Never    Sexual activity: Not Currently     Birth control/protection: Surgical        VITALS  Patient Vitals for the past 24 hrs:   BP Temp Temp src Pulse Resp SpO2 Height Weight   01/07/24 0741 128/74 98.3  F (36.8  C) Temporal 109 20 98 % 1.626 m (5' 4\") 84.4 kg (186 lb)        ================================================================    PHYSICAL EXAM     VITAL SIGNS: /74   Pulse 109   Temp 98.3  F (36.8  C) (Temporal)   Resp 20   Ht 1.626 m (5' 4\")   Wt 84.4 kg (186 lb)   SpO2 98%   BMI 31.93 kg/m     Constitutional:  Awake, uncomfortable appearing   HENT:  Atraumatic, oropharynx without exudate or erythema, membranes moist  Lymph:  No adenopathy  Eyes: EOM intact, PERRL, no injection  Neck: Supple  Respiratory:  Clear to auscultation bilaterally, no wheezes or crackles   Cardiovascular:  Regular rate and rhythm, single S1 and S2   GI:  Soft, RLQ tenderness, nondistended, no rebound or guarding   Back: Right SI tenderness  Musculoskeletal:  Moves all extremities, difficulty ambulating secondary to pain, no lower extremity edema, no deformities    Skin:  Warm, dry  Neurologic:  Alert and oriented x3, no focal deficits noted       ================================================================  LAB       All pertinent labs reviewed and interpreted.   Labs Ordered and Resulted from Time of ED Arrival to Time of ED Departure   COMPREHENSIVE METABOLIC PANEL - Abnormal       Result Value    Sodium 138      Potassium 5.1      Carbon Dioxide (CO2) 18 (*)     Anion Gap 11      Urea Nitrogen 13.0      Creatinine 0.72      GFR Estimate >90      Calcium 9.3      Chloride 109 (*)     " Glucose 92      Alkaline Phosphatase 108      AST 26      ALT 7      Protein Total 7.0      Albumin 4.4      Bilirubin Total 0.6     ROUTINE UA WITH MICROSCOPIC REFLEX TO CULTURE - Abnormal    Color Urine Light Yellow      Appearance Urine Clear      Glucose Urine Negative      Bilirubin Urine Negative      Ketones Urine Negative      Specific Gravity Urine 1.046 (*)     Blood Urine Negative      pH Urine 6.0      Protein Albumin Urine Negative      Urobilinogen Urine <2.0      Nitrite Urine Negative      Leukocyte Esterase Urine Negative      Mucus Urine Present (*)     RBC Urine 1      WBC Urine 1      Squamous Epithelials Urine 1     LIPASE - Normal    Lipase 40     CBC WITH PLATELETS AND DIFFERENTIAL    WBC Count 6.8      RBC Count 5.03      Hemoglobin 14.8      Hematocrit 44.4      MCV 88      MCH 29.4      MCHC 33.3      RDW 13.2      Platelet Count 222      % Neutrophils 53      % Lymphocytes 37      % Monocytes 7      % Eosinophils 2      % Basophils 1      % Immature Granulocytes 0      NRBCs per 100 WBC 0      Absolute Neutrophils 3.7      Absolute Lymphocytes 2.5      Absolute Monocytes 0.5      Absolute Eosinophils 0.1      Absolute Basophils 0.0      Absolute Immature Granulocytes 0.0      Absolute NRBCs 0.0          ===============================================================  RADIOLOGY       Reviewed all pertinent imaging. Please see official radiology report.   CT Abdomen Pelvis w Contrast   Final Result   IMPRESSION:    1.  No acute CT findings in the abdomen or pelvis.   2.  Subcentimeter left adrenal nodule. See recommendations below.   3.  Unchanged chronic findings as above.      REFERENCE:   Management of Incidental Adrenal Masses: A White Paper of the ACR Incidental Findings Committee. J Am Paramjit Radiol 2017;14:0698-4131.      <1 cm in short axis: No follow-up.               ================================================================  EKG         I have independently reviewed and  interpreted the EKG(s) documented above.     ================================================================  PROCEDURES         I, Sonu Aguayo, am serving as a scribe to document services personally performed by Dr. Allen based on my observation and the provider's statements to me. I, Mirlande Allen MD attest that Sonu Aguayo is acting in a scribe capacity, has observed my performance of the services and has documented them in accordance with my direction.   Mirlande Allen M.D.   Emergency Medicine   [facility] EMERGENCY ROOM  [address] [place] [address]  [phone]  Dept: [phone]      Mirlande Allen MD  01/07/24 1110

## 2024-01-07 NOTE — DISCHARGE INSTRUCTIONS
The abdominal CT is completely normal and all the lab work looks reassuring.  This fits with what we were thinking that this is actually likely a pinched nerve syndrome near the SI joint in your lower back and is radiating around to the front in your leg.  We are going to put you on a course of steroids for 5 days to help the inflammation.  Keep taking ibuprofen and alternating with Tylenol, using ice and heat.  Take the prescription for steroids, and I am adding some pain medicine for breakthrough pain if this is not working along with some muscle relaxers.  I have also placed a referral to the spine center and they should be calling you tomorrow to get an appointment scheduled with a specialist.

## 2024-01-19 ENCOUNTER — OFFICE VISIT (OUTPATIENT)
Dept: PHYSICAL MEDICINE AND REHAB | Facility: CLINIC | Age: 49
End: 2024-01-19
Attending: EMERGENCY MEDICINE
Payer: COMMERCIAL

## 2024-01-19 VITALS
DIASTOLIC BLOOD PRESSURE: 71 MMHG | HEIGHT: 64 IN | OXYGEN SATURATION: 100 % | SYSTOLIC BLOOD PRESSURE: 128 MMHG | WEIGHT: 190 LBS | BODY MASS INDEX: 32.44 KG/M2

## 2024-01-19 DIAGNOSIS — R32 URINARY INCONTINENCE, UNSPECIFIED TYPE: Primary | ICD-10-CM

## 2024-01-19 DIAGNOSIS — M54.41 ACUTE RIGHT-SIDED LOW BACK PAIN WITH RIGHT-SIDED SCIATICA: ICD-10-CM

## 2024-01-19 PROCEDURE — 99204 OFFICE O/P NEW MOD 45 MIN: CPT | Performed by: NURSE PRACTITIONER

## 2024-01-19 RX ORDER — CYCLOBENZAPRINE HCL 10 MG
10 TABLET ORAL 3 TIMES DAILY PRN
Qty: 45 TABLET | Refills: 1 | Status: SHIPPED | OUTPATIENT
Start: 2024-01-19

## 2024-01-19 RX ORDER — IBUPROFEN 800 MG/1
800 TABLET, FILM COATED ORAL EVERY 8 HOURS PRN
COMMUNITY

## 2024-01-19 RX ORDER — IBUPROFEN 800 MG/1
800 TABLET, FILM COATED ORAL EVERY 8 HOURS PRN
Qty: 45 TABLET | Refills: 1 | Status: SHIPPED | OUTPATIENT
Start: 2024-01-19

## 2024-01-19 ASSESSMENT — PAIN SCALES - GENERAL: PAINLEVEL: MODERATE PAIN (5)

## 2024-01-19 NOTE — LETTER
1/19/2024         RE: Oliva Berry  [address]  [place] [address]        Dear Colleague,    Thank you for referring your patient, Oliva Berry, to the [facility] SPINE [facility]ERY. Please see a copy of my visit note below.    ASSESSMENT: Oliva Berry is a 48 year old female who presents for consultation at the request of PCP Clinic, Yue Berry, who presents today for new patient evaluation of:    -lumbar radiculopathy    Patient is neurologically intact on exam. Back/leg pain better, but c/o saddle anesthesia with loss of b/b control. We reviewed her CT abdomen/pelvis which has a limited view of the spine however does appear she has a grade 1 listhesis at L4-5 with what looks like a disc bulge at this level. Recommend lumbar MRI without contrast for further evaluation given red flag symptoms. Given pain otherwise overall improving, ok to continue current medications at this time. Refills provided of flexeril and ibuprofen 800mg. We will review imaging once available in person. PT referral placed.         No data to display                     Diagnoses and all orders for this visit:  Urinary incontinence, unspecified type  -     MR Lumbar Spine w/o Contrast; Future  Acute right-sided low back pain with right-sided sciatica  -     Spine  Referral  -     MR Lumbar Spine w/o Contrast; Future  -     ibuprofen (ADVIL/MOTRIN) 800 MG tablet; Take 1 tablet (800 mg) by mouth every 8 hours as needed for inflammatory pain Take with food. Stop if blood in stool.  -     cyclobenzaprine (FLEXERIL) 10 MG tablet; Take 1 tablet (10 mg) by mouth 3 times daily as needed for muscle spasms  -     Physical Therapy Referral; Future      PLAN:  Reviewed spine anatomy and disease process. Discussed diagnosis and treatment options with the patient today. A shared decision making model was used.  The patient's values and choices were respected. The following represents what was discussed and  decided upon by the provider and the patient.      -DIAGNOSTIC TESTS:  Images were personally reviewed and interpreted and explained to patient today using a spine model.   --I ordered a lumbar MRI without contrast    -PHYSICAL THERAPY:    --I recommended PT, referral placed   Discussed the importance of core strengthening, ROM, stretching exercises with the patient and how each of these entities is important in decreasing pain.  Explained to the patient that the purpose of physical therapy is to teach the patient a home exercise program.  These exercises need to be performed every day in order to decrease pain and prevent future occurrences of pain.        -MEDICATIONS:    --refilled ibuprofen 800mg  --refilled flexeril 10mg  -continue tylenol as directed as needed  -ok to continue current rx oxycodone for severe pain per current prescriber    Discussed multiple medication options today with patient. Discussed risks, side effects, and proper use of medications. Patient verbalized understanding.    -INTERVENTIONS:    Discussed risks and benefits of injections with patient today. Patient would be a good candidate in the future for either epidural steroid injections or medial branch blocks if indicated based on symptoms and supported by imaging results.    -PATIENT EDUCATION:  Total time of 40 minutes, on the day of service, spent with the patient, reviewing the chart, placing orders, and documenting.   -Today we also discussed the pros and cons of the current treatment plan.    -FOLLOW-UP:   in person to review MRI    Advised patient to call the Spine Center if symptoms worsen, new numbness or weakness develops in the legs, or if they develop new or worsening problems controlling bladder or bowel function.   ______________________________________________________________________    SUBJECTIVE:    HPI:  Oliva Berry  Is a 48 year old female hx chronic headaches who presents today for new patient evaluation of lumbar  radiculopathy     Patient c/o several weeks of right lower back pain into the R buttock, R thigh which started without trauma.  The week prior, she noticed diarrhea the whole week, but only a litle pain.  She started having back pain when standing up from a seated position. Once she got up, she did fine. It got worse and worse. Then began bothering her with walking as well. The pain radiated into the right anterior proximal thigh and at its worst the entire leg into the foot. It did not let up. In the morning, it woke her up out of sleep. She was in such pain that she could not walk and her whole body was shaking.     She went to the ED on 1/7/24. They did a CT abdomen/pelvis. She started throwing up in the scanner because she was in such severe pain. The pain went all the way down the leg. Legs were weak and her pain was unbearable she could not move her legs. She has had loss of bladder control. She is not having feeling when she has a bm. Pain is overall better but still a 5/10. At this point, the lower leg pain is gone and it mostly radiates intermediate down the right anterior thigh. Worse with standing, walking.     She was treated with prednisone for 5 days. She is not sure if it helped. She is taking the flexeril 10mg TID, has 2 tabs left  She is taking ibuprofen 800mg TID as well. She took an oxycodone this morning. She was taking tylenol but has not used lately.     She has not had any previous PT, injections, surgeries, or chiropractic care.    She denies leg weakness.    -Treatment to Date:     -Medications:  Tylenol  Ibuprofen 800  Prednisone  Flexeril 10  oxycodone    Current Outpatient Medications   Medication     acetaminophen (TYLENOL) 325 MG tablet     cyclobenzaprine (FLEXERIL) 10 MG tablet     ibuprofen (ADVIL/MOTRIN) 800 MG tablet     ibuprofen (ADVIL/MOTRIN) 800 MG tablet     topiramate (TOPAMAX) 50 MG tablet     No current facility-administered medications for this visit.       Allergies   Allergen  Reactions     Sulfa (Sulfonamide Antibiotics) [Sulfa Antibiotics] Shortness Of Breath     Penicillins Rash     Sulfa Antibiotics Difficulty breathing     Trazodone Difficulty breathing     Metronidazole Nausea and Vomiting     rashes     Penicillins Rash       Past Medical History:   Diagnosis Date     Pituitary adenoma (H)     prolactinoma? - (based on sx)        Patient Active Problem List   Diagnosis     Chronic headache     Class 1 obesity due to excess calories without serious comorbidity with body mass index (BMI) of 32.0 to 32.9 in adult     Migraine     Postoperative infection, unspecified type, initial encounter     Postoperative infection, initial encounter     Postoperative infection, subsequent encounter       Past Surgical History:   Procedure Laterality Date     TUBAL LIGATION  2015     ZZC EXCIS PITUITARY,TRANSNASAL/SEPTAL  2002    s/p pit adenoma removal       Family History   Problem Relation Age of Onset     Cancer Mother      Lung Cancer Mother      Colon Cancer Mother      Diabetes Mother      Cancer Father         lung cancer     Lung Cancer Father      Cancer Paternal Aunt         breast cancer     Cancer Maternal Grandmother         cervical cancer     Cancer Maternal Grandfather         lung cancer     Cancer Paternal Grandfather         lung cancer     Cancer Maternal Aunt         breast cancer, lupus       Reviewed past medical, surgical, and family history with patient found on new patient intake packet located in EMR Media tab.     SOCIAL HX: smoker, no alcohol use, no heavy drinking, no rec drug use    ROS: positive for weight gain, diarrhea, loss of bowel control and bladder control. Specifically negative for balance changes, headache, dizziness, foot drop, fevers, chills, appetite changes, nausea/vomiting, unexplained weight loss. Otherwise 13 systems reviewed are negative. Please see the patient's intake questionnaire from today for details.    OBJECTIVE:  /71 (BP Location:  "Right arm, Patient Position: Sitting)   Ht 5' 4\" (1.626 m)   Wt 190 lb (86.2 kg)   SpO2 100%   BMI 32.61 kg/m      PHYSICAL EXAMINATION:    --CONSTITUTIONAL:  Vital signs as above.  No acute distress.  The patient is well nourished and well groomed. Appears uncomfortable due to pain at times during exam  --PSYCHIATRIC:  Appropriate mood and affect. The patient is awake, alert, oriented to person, place, time and answering questions appropriately with clear speech.    --SKIN:  Skin over the face, bilateral lower extremities, and posterior torso is clean, dry, intact without rashes.    --RESPIRATORY: Normal rhythm and effort. No abnormal accessory muscle breathing patterns noted.   --ABDOMINAL:  Non-distended.    --GROSS MOTOR: Gait is non-antalgic. Easily arises from a seated position. Toe walking and heel walking are normal without significant difficulty.      --LOWER EXTREMITY MOTOR TESTING:  Hip flexion: right 5/5, left 5/5  Hip abduction: right 5/5, left 5/5  Hip adduction: right 5/5, left 5/5   Quads: right 5/5, left 5/5  Hamstrings: right 5/5, left 5/5  Dorsiflexion: right 5/5, left 5/5  Plantar flexion: right 5/5, left 5/5    Great toe MTP extension/EHL: right 5/5, left 5/5    --NEUROLOGICAL:  2/4 patellar and achilles reflexes bilaterally. Sensation to light touch is intact throughout both lower extremities. Babinski is negative. No clonus.    --MUSCULOSKELETAL: Lumbar spine inspection reveals no evidence of deformity. Range of motion is limited in lumbar flexion, extension, lateral rotation. No point tenderness to palpation lumbar spine. No paraspinal musculature tenderness.     Straight leg raising is negative.    --HIPS: Full range of motion bilaterally. Positive R AMINA and Negative FADIR bilaterally. Negative hip joint tenderness to palp.    --SACROILIAC JOINT: One finger point test was negative. Negative SI joint tenderness to palpation bilaterally.    --VASCULAR:  Bilateral lower extremities are " warm without any discoloration.  There is no pitting edema of the bilateral lower extremities.    RESULTS:   Prior medical records from [facility] Everywhere were reviewed today.    Imaging: Spine imaging was personally reviewed and interpreted today. The images were shown to the patient and the findings were explained using a spine model.      CT Abdomen Pelvis w Contrast    Result Date: 1/7/2024  EXAM: CT ABDOMEN PELVIS W CONTRAST LOCATION: [facility] DATE: 1/7/2024 INDICATION: Right lower quadrant abdominal pain. COMPARISON: CT 06/28/2021. TECHNIQUE: CT scan of the abdomen and pelvis was performed following injection of IV contrast. Multiplanar reformats were obtained. Dose reduction techniques were used. CONTRAST: 90ml Isovue 370 FINDINGS: LOWER CHEST: Normal heart size. Mild linear scarring/atelectasis of the right middle lobe. HEPATOBILIARY: No significant mass or bile duct dilatation. No calcified gallstones. PANCREAS: No peripancreatic inflammatory change. Similar mild prominence of the main pancreatic duct. SPLEEN: Normal. ADRENAL GLANDS: Left adrenal nodule measuring 8 mm, noting that this area was obscured by respiratory motion artifact on the prior exam. Right adrenal is unremarkable. KIDNEYS/BLADDER: Symmetric renal enhancement. No hydronephrosis. Small cyst of the left upper pole kidney, not requiring specific follow-up. The urinary bladder is unremarkable. BOWEL: The appendix is within normal limits. Mild stool burden in the colon. No evidence of bowel obstruction LYMPH NODES: Normal. VASCULATURE: Unremarkable. PELVIC ORGANS: Normal. MUSCULOSKELETAL: Postsurgical changes of abdominoplasty. No acute osseous findings. Facet arthropathy of the lumbar spine. Grade 1 anterolisthesis of L4 on L5.     IMPRESSION: 1.  No acute CT findings in the abdomen or pelvis. 2.  Subcentimeter left adrenal nodule. See recommendations below. 3.  Unchanged chronic findings as  above. REFERENCE: Management of Incidental Adrenal Masses: A White Paper of the ACR Incidental Findings Committee. J Am Paramjit Radiol 2017;14:6198-8071. <1 cm in short axis: No follow-up.           Maida CHILDERS-C  [facility]  O. [phone]             Again, thank you for allowing me to participate in the care of your patient.        Sincerely,        ROSALIE Flynn CNP

## 2024-01-19 NOTE — PROGRESS NOTES
ASSESSMENT: Oliva Berry is a 48 year old female who presents for consultation at the request of PCP Clinic, Yue Berry, who presents today for new patient evaluation of:    -lumbar radiculopathy    Patient is neurologically intact on exam. Back/leg pain better, but c/o saddle anesthesia with loss of b/b control. We reviewed her CT abdomen/pelvis which has a limited view of the spine however does appear she has a grade 1 listhesis at L4-5 with what looks like a disc bulge at this level. Recommend lumbar MRI without contrast for further evaluation given red flag symptoms. Given pain otherwise overall improving, ok to continue current medications at this time. Refills provided of flexeril and ibuprofen 800mg. We will review imaging once available in person. PT referral placed.         No data to display                     Diagnoses and all orders for this visit:  Urinary incontinence, unspecified type  -     MR Lumbar Spine w/o Contrast; Future  Acute right-sided low back pain with right-sided sciatica  -     Spine  Referral  -     MR Lumbar Spine w/o Contrast; Future  -     ibuprofen (ADVIL/MOTRIN) 800 MG tablet; Take 1 tablet (800 mg) by mouth every 8 hours as needed for inflammatory pain Take with food. Stop if blood in stool.  -     cyclobenzaprine (FLEXERIL) 10 MG tablet; Take 1 tablet (10 mg) by mouth 3 times daily as needed for muscle spasms  -     Physical Therapy Referral; Future      PLAN:  Reviewed spine anatomy and disease process. Discussed diagnosis and treatment options with the patient today. A shared decision making model was used.  The patient's values and choices were respected. The following represents what was discussed and decided upon by the provider and the patient.      -DIAGNOSTIC TESTS:  Images were personally reviewed and interpreted and explained to patient today using a spine model.   --I ordered a lumbar MRI without contrast    -PHYSICAL THERAPY:    --I recommended PT,  referral placed   Discussed the importance of core strengthening, ROM, stretching exercises with the patient and how each of these entities is important in decreasing pain.  Explained to the patient that the purpose of physical therapy is to teach the patient a home exercise program.  These exercises need to be performed every day in order to decrease pain and prevent future occurrences of pain.        -MEDICATIONS:    --refilled ibuprofen 800mg  --refilled flexeril 10mg  -continue tylenol as directed as needed  -ok to continue current rx oxycodone for severe pain per current prescriber    Discussed multiple medication options today with patient. Discussed risks, side effects, and proper use of medications. Patient verbalized understanding.    -INTERVENTIONS:    Discussed risks and benefits of injections with patient today. Patient would be a good candidate in the future for either epidural steroid injections or medial branch blocks if indicated based on symptoms and supported by imaging results.    -PATIENT EDUCATION:  Total time of 40 minutes, on the day of service, spent with the patient, reviewing the chart, placing orders, and documenting.   -Today we also discussed the pros and cons of the current treatment plan.    -FOLLOW-UP:   in person to review MRI    Advised patient to call the [facility] if symptoms worsen, new numbness or weakness develops in the legs, or if they develop new or worsening problems controlling bladder or bowel function.   ______________________________________________________________________    SUBJECTIVE:    HPI:  Oliva Berry  Is a 48 year old female hx chronic headaches who presents today for new patient evaluation of lumbar radiculopathy     Patient c/o several weeks of right lower back pain into the R buttock, R thigh which started without trauma.  The week prior, she noticed diarrhea the whole week, but only a litle pain.  She started having back pain when standing up from a  seated position. Once she got up, she did fine. It got worse and worse. Then began bothering her with walking as well. The pain radiated into the right anterior proximal thigh and at its worst the entire leg into the foot. It did not let up. In the morning, it woke her up out of sleep. She was in such pain that she could not walk and her whole body was shaking.     She went to the ED on 1/7/24. They did a CT abdomen/pelvis. She started throwing up in the scanner because she was in such severe pain. The pain went all the way down the leg. Legs were weak and her pain was unbearable she could not move her legs. She has had loss of bladder control. She is not having feeling when she has a bm. Pain is overall better but still a 5/10. At this point, the lower leg pain is gone and it mostly radiates FPC down the right anterior thigh. Worse with standing, walking.     She was treated with prednisone for 5 days. She is not sure if it helped. She is taking the flexeril 10mg TID, has 2 tabs left  She is taking ibuprofen 800mg TID as well. She took an oxycodone this morning. She was taking tylenol but has not used lately.     She has not had any previous PT, injections, surgeries, or chiropractic care.    She denies leg weakness.    -Treatment to Date:     -Medications:  Tylenol  Ibuprofen 800  Prednisone  Flexeril 10  oxycodone    Current Outpatient Medications   Medication    acetaminophen (TYLENOL) 325 MG tablet    cyclobenzaprine (FLEXERIL) 10 MG tablet    ibuprofen (ADVIL/MOTRIN) 800 MG tablet    ibuprofen (ADVIL/MOTRIN) 800 MG tablet    topiramate (TOPAMAX) 50 MG tablet     No current facility-administered medications for this visit.       Allergies   Allergen Reactions    Sulfa (Sulfonamide Antibiotics) [Sulfa Antibiotics] Shortness Of Breath    Penicillins Rash    Sulfa Antibiotics Difficulty breathing    Trazodone Difficulty breathing    Metronidazole Nausea and Vomiting     rashes    Penicillins Rash       Past  "Medical History:   Diagnosis Date    Pituitary adenoma (H)     prolactinoma? - (based on sx)        Patient Active Problem List   Diagnosis    Chronic headache    Class 1 obesity due to excess calories without serious comorbidity with body mass index (BMI) of 32.0 to 32.9 in adult    Migraine    Postoperative infection, unspecified type, initial encounter    Postoperative infection, initial encounter    Postoperative infection, subsequent encounter       Past Surgical History:   Procedure Laterality Date    TUBAL LIGATION  2015    ZZC EXCIS PITUITARY,TRANSNASAL/SEPTAL  2002    s/p pit adenoma removal       Family History   Problem Relation Age of Onset    Cancer Mother     Lung Cancer Mother     Colon Cancer Mother     Diabetes Mother     Cancer Father         lung cancer    Lung Cancer Father     Cancer Paternal Aunt         breast cancer    Cancer Maternal Grandmother         cervical cancer    Cancer Maternal Grandfather         lung cancer    Cancer Paternal Grandfather         lung cancer    Cancer Maternal Aunt         breast cancer, lupus       Reviewed past medical, surgical, and family history with patient found on new patient intake packet located in EMR Media tab.     SOCIAL HX: smoker, no alcohol use, no heavy drinking, no rec drug use    ROS: positive for weight gain, diarrhea, loss of bowel control and bladder control. Specifically negative for balance changes, headache, dizziness, foot drop, fevers, chills, appetite changes, nausea/vomiting, unexplained weight loss. Otherwise 13 systems reviewed are negative. Please see the patient's intake questionnaire from today for details.    OBJECTIVE:  /71 (BP Location: Right arm, Patient Position: Sitting)   Ht 5' 4\" (1.626 m)   Wt 190 lb (86.2 kg)   SpO2 100%   BMI 32.61 kg/m      PHYSICAL EXAMINATION:    --CONSTITUTIONAL:  Vital signs as above.  No acute distress.  The patient is well nourished and well groomed. Appears uncomfortable due to pain at " times during exam  --PSYCHIATRIC:  Appropriate mood and affect. The patient is awake, alert, oriented to person, place, time and answering questions appropriately with clear speech.    --SKIN:  Skin over the face, bilateral lower extremities, and posterior torso is clean, dry, intact without rashes.    --RESPIRATORY: Normal rhythm and effort. No abnormal accessory muscle breathing patterns noted.   --ABDOMINAL:  Non-distended.    --GROSS MOTOR: Gait is non-antalgic. Easily arises from a seated position. Toe walking and heel walking are normal without significant difficulty.      --LOWER EXTREMITY MOTOR TESTING:  Hip flexion: right 5/5, left 5/5  Hip abduction: right 5/5, left 5/5  Hip adduction: right 5/5, left 5/5   Quads: right 5/5, left 5/5  Hamstrings: right 5/5, left 5/5  Dorsiflexion: right 5/5, left 5/5  Plantar flexion: right 5/5, left 5/5    Great toe MTP extension/EHL: right 5/5, left 5/5    --NEUROLOGICAL:  2/4 patellar and achilles reflexes bilaterally. Sensation to light touch is intact throughout both lower extremities. Babinski is negative. No clonus.    --MUSCULOSKELETAL: Lumbar spine inspection reveals no evidence of deformity. Range of motion is limited in lumbar flexion, extension, lateral rotation. No point tenderness to palpation lumbar spine. No paraspinal musculature tenderness.     Straight leg raising is negative.    --HIPS: Full range of motion bilaterally. Positive R AMINA and Negative FADIR bilaterally. Negative hip joint tenderness to palp.    --SACROILIAC JOINT: One finger point test was negative. Negative SI joint tenderness to palpation bilaterally.    --VASCULAR:  Bilateral lower extremities are warm without any discoloration.  There is no pitting edema of the bilateral lower extremities.    RESULTS:   [facility] medical records from [facility] and [facility]where were reviewed today.    Imaging: Spine imaging was personally reviewed and interpreted today. The images were shown to  the patient and the findings were explained using a spine model.      CT Abdomen Pelvis w Contrast    Result Date: 1/7/2024  EXAM: CT ABDOMEN PELVIS W CONTRAST LOCATION: [facility] DATE: 1/7/2024 INDICATION: Right lower quadrant abdominal pain. COMPARISON: CT 06/28/2021. TECHNIQUE: CT scan of the abdomen and pelvis was performed following injection of IV contrast. Multiplanar reformats were obtained. Dose reduction techniques were used. CONTRAST: 90ml Isovue 370 FINDINGS: LOWER CHEST: Normal heart size. Mild linear scarring/atelectasis of the right middle lobe. HEPATOBILIARY: No significant mass or bile duct dilatation. No calcified gallstones. PANCREAS: No peripancreatic inflammatory change. Similar mild prominence of the main pancreatic duct. SPLEEN: Normal. ADRENAL GLANDS: Left adrenal nodule measuring 8 mm, noting that this area was obscured by respiratory motion artifact on the prior exam. Right adrenal is unremarkable. KIDNEYS/BLADDER: Symmetric renal enhancement. No hydronephrosis. Small cyst of the left upper pole kidney, not requiring specific follow-up. The urinary bladder is unremarkable. BOWEL: The appendix is within normal limits. Mild stool burden in the colon. No evidence of bowel obstruction LYMPH NODES: Normal. VASCULATURE: Unremarkable. PELVIC ORGANS: Normal. MUSCULOSKELETAL: Postsurgical changes of abdominoplasty. No acute osseous findings. Facet arthropathy of the lumbar spine. Grade 1 anterolisthesis of L4 on L5.     IMPRESSION: 1.  No acute CT findings in the abdomen or pelvis. 2.  Subcentimeter left adrenal nodule. See recommendations below. 3.  Unchanged chronic findings as above. REFERENCE: Management of Incidental Adrenal Masses: A White Paper of the ACR Incidental Findings Committee. J Am Paramjit Radiol 2017;14:6844-3869. <1 cm in short axis: No follow-up.           Maida Novoa FNP-C  [facility]  O. [phone]

## 2024-01-19 NOTE — PATIENT INSTRUCTIONS
Prescribed ibuprofen 800mg today. Please take as prescribed, as needed for pain control as well as to aid in decreasing inflammation.   Take medication with a full glass of water and with food.   *Do not take Advil, Ibuprofen, Aleve, or Naproxen while taking this medication as it can cause organ failure if taken together*  This medication does have risks if taken long-term, these risks include: gastrointestinal irritation, kidney dysfunction, and cardiovascular effects.  We will check your kidney function as needed while you're on this medication.  Stop taking this medication if you have intolerable side effects or blood in the stool.     Flexeril 10mg (muscle relaxant medication) has been prescribed today. Please take three times daily as needed for spasms. This medication may cause drowsiness. Please do not work or drive while taking this medication until you know how it affects you. If it does make you drowsy, you should only take it before bedtime or at times that you do not have to work/drive.     You can take tylenol over the counter as well as directed    PT prescription provided. Patient to coordinate   Importance of specialized Physical Therapy:     Today, we discussed the importance of core strengthening, ROM, and stretching exercises, and how each of these are key in decreasing pain.   The purpose of physical therapy is to teach patients a home exercise program individualized to them and their specific health concerns.  These exercises need to be performed every day in order to decrease pain and prevent future occurrences of pain.      Imaging (Xray, CT, or MRI) has been ordered today.   Radiology will call you to schedule. Please call below if you do not hear from them in the next couple of days.     [facility] Scheduling:  Please call [phone] to schedule your image(s) (select option #1).    There are 3 different locations:    [address]  [address]    [facility]  [address], [address]   [facility] [facility] [address]    [facility]  [address] [address]       ~Please call our [facility] Nurse Navigation line [phone] with any questions or concerns about your treatment plan, if symptoms worsen and you would like to be seen urgently, or if you have any new or worsening numbness, weakness, or problems controlling bladder and bowel function.  ~You are also welcome to contact Maida Novoa via [employer], but please be aware that responses to [employer] message may take 2-3 days due to the high volume of patients seen in clinic.

## 2025-01-05 ENCOUNTER — HEALTH MAINTENANCE LETTER (OUTPATIENT)
Age: 50
End: 2025-01-05